# Patient Record
Sex: FEMALE | Race: WHITE | NOT HISPANIC OR LATINO
[De-identification: names, ages, dates, MRNs, and addresses within clinical notes are randomized per-mention and may not be internally consistent; named-entity substitution may affect disease eponyms.]

---

## 2023-05-25 PROBLEM — Z00.00 ENCOUNTER FOR PREVENTIVE HEALTH EXAMINATION: Status: ACTIVE | Noted: 2023-05-25

## 2023-06-07 ENCOUNTER — NON-APPOINTMENT (OUTPATIENT)
Age: 28
End: 2023-06-07

## 2023-06-07 ENCOUNTER — TRANSCRIPTION ENCOUNTER (OUTPATIENT)
Age: 28
End: 2023-06-07

## 2023-06-07 ENCOUNTER — APPOINTMENT (OUTPATIENT)
Dept: NEUROLOGY | Facility: CLINIC | Age: 28
End: 2023-06-07
Payer: COMMERCIAL

## 2023-06-07 VITALS
HEART RATE: 79 BPM | WEIGHT: 204 LBS | BODY MASS INDEX: 32.02 KG/M2 | DIASTOLIC BLOOD PRESSURE: 71 MMHG | OXYGEN SATURATION: 98 % | SYSTOLIC BLOOD PRESSURE: 115 MMHG | HEIGHT: 67 IN

## 2023-06-07 DIAGNOSIS — N20.0 CALCULUS OF KIDNEY: ICD-10-CM

## 2023-06-07 PROCEDURE — 99205 OFFICE O/P NEW HI 60 MIN: CPT

## 2023-06-10 RX ORDER — ONABOTULINUMTOXINA 100 [USP'U]/1
100 INJECTION, POWDER, LYOPHILIZED, FOR SOLUTION INTRADERMAL; INTRAMUSCULAR
Qty: 1 | Refills: 0 | Status: ACTIVE | COMMUNITY
Start: 2023-06-10

## 2023-06-10 RX ORDER — SEMAGLUTIDE 1 MG/.5ML
1 INJECTION, SOLUTION SUBCUTANEOUS
Qty: 1 | Refills: 0 | Status: ACTIVE | COMMUNITY
Start: 2023-06-10

## 2023-06-10 NOTE — ASSESSMENT
[FreeTextEntry1] : Patient is a 26yo female with history of intracranial hypertension who presents for evaluation of headaches.  Her headaches have been generally well controlled on current regimen of botox however recent recurrence of headaches.  She is not on disease modifying therapy for pseudotumor cerebri despite symptomatic management.  Her present headaches to have a migranous component to them, however given risk of complication of unmanaged pseudotumor will ensure thorough complete evaluation that this is appropriately corrected.\par \par -To review medical records provided at visit and provide further recommendations for need for further testing/medical management\par -Poor candidate given intolerance/side effects to oral medication management for condition\par -Patient wishes to continue botox therapy at site closer to her home ([facility])\par -Eval ophthalmology records to ensure pressure appropriately managed\par -Encouraged headache diary, well managed in recent history until last few days, not a clearly established new headache pattern so encouraged patient to call next week if headaches are persisting\par -Follow up via telemedicine in 1 month

## 2023-06-10 NOTE — HISTORY OF PRESENT ILLNESS
[FreeTextEntry1] : Patient is a 26yo female with history of intracranial hypertension who presents for evaluation of intracranial hypertension.  The patient has been evaluated previously and has confirmed diagnosis of pseudotumor cerebri. About one year ago the patient began experiencing significant headaches. She was initially evaluated by a neurologist (Dr. Aiken) who felt she had primary headaches. She was later seen by Dr. Jackson (neurology) in [facility] for neurology and was sent to the hospital.  She had lumbar puncture done.  Opening pressure was [id number] at that time.  She had been treated with nurtec, amitriptyline, and botox.  She was on diamox for period of time but this caused significant swelling in her legs so it was discontinued.  She is not presently on any pseudotumor specific medications.  She had MRV done on 6/1/23 which was unremarkable. She has also started working with a weight loss specialist and has lost about 20 pounds since the end of march with diet, exercise, and wegovy.  She finds that when she lost 20 pounds last year and later regained the weight she had improvement in her headaches.  Also related to stress.  She has lightheadedness if she stands up too quickly.  She has had some increase in feeling off balance.  She started on botox about 5 weeks ago and has overall good efficacy.  She had mirena removed as there was a consideration that potentially her headaches were worsening related to hormonal involvement. She is not scheduled for botox until august. She has had reasonably good headache control on botox however has had recent increase in headaches over the last two days.  She has had 6/10 pain severity headaches extending into back and shoulders over last few days.  Of note, discussion previously with her neurologist regarding topamax however she has recent history of kidney stones so topamax was not recommended.  \par \par She started having severe headaches about one year ago.  She had significant pressure behind both eyes.  She started losing her vision in November.  She has had more severe headaches over the last few days.  If she moves her head too quickly then it can cause some floaters and abnormal vision.  She recently had her eyes checked by ophthalmology who did not feel that she had optic disc swelling.  She was evaluated with them 6 days prior to current visit.  They are conducting peripheral vision testing on 6/15/23.  No prior history of headaches prior to one year ago. \par \par Patient provided permission to leave [facility] if any additional orders are required.  She provided thorough documentation of her medical history at the time of the visit.  \par \par Patient care team: \par [facility] \par [facility] ([place])\par \par Frequency: once every 3-4 days, brief when comes about\par Severity: 8/10\par Duration: Once starts, persists all day  \par Quality: Pressure behind eyes\par Location: Back of skull and behind eyes when severe\par Photophobia/Phonophobia: ++ photophobia, + phonophobia\par Nausea/Vomiting:  +N/V\par Sensory auras: Tingling just with headaches\par Other associated symptoms:\par Disabling Deficits: Call out of work, leave work early because of severity\par Medication Trial: Nurtec (not effective), diamox (swelling), amitriptyline (weight gain), botox (helpful without adverse effect), ibuprofen (high doses, no relief)\par Alleviating Factors: Amitriptyline\par Aggravating Factors: Light \par Positional Component: Laying down more comfortable\par Other: Blurred vision, neck tension, tingling sensation (not pins and needles), no history of neck problems. 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

## 2023-06-10 NOTE — PHYSICAL EXAM
[FreeTextEntry1] : Constitutional: Well nourished, well developed adult in no distress\par Psych: Mood broad, affect not restricted\par Head: NCAT. Negative occipital notch tenderness\par Eyes: Anicteric\par Ears: Normal appearing pinna\par Neck: Normal appearing and not enlarged\par Lungs: No accessory muscle use, no respiratory distress\par Cardiac/Vascular: No edema\par Abdomen: Non-distended\par Skin: No visible lesions or rashes. Skin is warm and dry\par \par Neurological Examination\par Mental status: Awake, alert, appropriate.  Gives detailed history. oriented to person, place, and time.\par \par Cranial nerves  \par II: Visual acuity grossly intact, visual fields full\par III, IV, VI: PERRLA, EOMI, no nystagmus \par V: Facial sensation is normal B/L.    \par VII: Facial strength is normal B/L.  \par VIII:   Hearing grossly intact bilaterally to spoken voice although not formally assessed\par IX, X: Palate is midline and elevates symmetrically.    \par XI: Trapezius normal strength.    \par XII: Tongue midline without atrophy or fasciculations.    \par \par Speech: No aphasia, dysarthria.  Normal intonation and prosody of speech\par Motor: Normal muscle bulk and tone throughout.  \par Muscle Strength: 5/5 muscle strength throughout bilateral upper and lower extremities with assessed shoulder abduction, elbow flexion/extension, hand , hip flexion, knee flexion/extension, ankle plantarflexion/dorsiflexion\par Reflexes: 2+ reflexes throughout. \par Coordination No ataxia - FTN, HTS within normal limits. No tremor or dysmetria\par Sensory: Sensation to light touch intact bilateral upper and lower extremities\par Station: No loss of balance.\par Gait  Normal stance, no loss of balance\par

## 2023-06-10 NOTE — DATA REVIEWED
[No studies available for review at this time.] : No studies available for review at this time. [de-identified] : MRV: No obvious thrombosis

## 2023-06-19 ENCOUNTER — NON-APPOINTMENT (OUTPATIENT)
Age: 28
End: 2023-06-19

## 2023-07-03 RX ORDER — UBROGEPANT 50 MG/1
TABLET ORAL
Qty: 10 | Refills: 0 | Status: ACTIVE | COMMUNITY
Start: 2023-06-21 | End: 1900-01-01

## 2023-07-19 ENCOUNTER — APPOINTMENT (OUTPATIENT)
Dept: NEUROLOGY | Facility: CLINIC | Age: 28
End: 2023-07-19
Payer: COMMERCIAL

## 2023-07-19 DIAGNOSIS — G43.909 MIGRAINE, UNSPECIFIED, NOT INTRACTABLE, W/OUT STATUS MIGRAINOSUS: ICD-10-CM

## 2023-07-19 DIAGNOSIS — G93.2 BENIGN INTRACRANIAL HYPERTENSION: ICD-10-CM

## 2023-07-19 PROCEDURE — 99213 OFFICE O/P EST LOW 20 MIN: CPT | Mod: 95

## 2023-07-19 NOTE — DATA REVIEWED
[No studies available for review at this time.] : No studies available for review at this time. [de-identified] : MRV: No obvious thrombosis

## 2023-07-19 NOTE — PHYSICAL EXAM
[FreeTextEntry1] : Constitutional: Well nourished, well developed adult in no distress\par Psych: Mood broad, affect not restricted\par Eyes: Anicteric\par Ears: Normal appearing pinna\par Neck: Normal appearing and not enlarged\par Skin: No visible lesions or rashes. Skin is warm and dry\par \par Neurological Examination\par Mental status: Awake, alert, appropriate.  Gives detailed history. oriented to person, place, and time.\par \par Cranial nerves  \par VII: Facial strength is normal B/L.  \par VIII:   Hearing grossly intact bilaterally to spoken voice although not formally assessed\par

## 2023-07-19 NOTE — ASSESSMENT
[FreeTextEntry1] : Patient is a 28yo female with history of intracranial hypertension who presents for evaluation of headaches.  Her headaches have been generally well controlled on current regimen of botox however recent recurrence of headaches.  She is not on disease modifying therapy for pseudotumor cerebri despite symptomatic management.  Recent ophtho eval suggests good management of optic disc swelling with weight loss.  Her present headaches to have a migranous component to them, however given risk of complication of unmanaged pseudotumor will ensure thorough complete evaluation that this is appropriately corrected.\par \par -Poor candidate given intolerance/side effects to oral medication management for condition\par -Continue aimovig 70mg/mL monthly \par -Continue ubrelvy for abortive therapy\par -Patient wishes to continue botox therapy at site closer to her home ([facility]), will consider if ongoing botox is needed after next visit, encourage her to give trial to extending botox visits to monitor for any persistent symptoms  with wearing given efficacy of aimovig\par -Follow up via telemedicine prior to next scheduled botox visit

## 2023-07-19 NOTE — HISTORY OF PRESENT ILLNESS
[FreeTextEntry1] : Patient is a 28yo female with history of intracranial hypertension who presents for evaluation of intracranial hypertension.  \par \par Follow up  TEB with Kenia Brunson PA-C on 7/19/23\par She feels that the aimovig and ubrelvy has been helping.  She removed before administering the first dose.  She has had to use ubrelvy 50mg x 2 once and 1 pill on another occasion.  She has had some other breakthrough headaches which are more dull and mild in severity.  Her headaches are to the point where she is able to fully function.  She had a sharp pain on left side of her head that lasted for about an hour and then resolved.  She has some days.  She had good efficacy with ubrelvy for response.  She denies injection site rash, but did have pain at injection site.  She has had some increase in neck pain and into shoulders in area of trapezius.  That is often the first symptom that comes about for her preceding onset of a headache.  She finds that the pain is uncomfortable and she is able to function with these issues.  She has some days where she wakes up with dull pain but it improves quickly.  Vision is not anywhere near as bad as it used to be after birth control was discontinued.  She has a handful of times where she felt that she had a fog in her left eye but nothing prominent or persistent.  She is no longer getting blurred vision, lines in her vision.  She is due in 2 weeks for her next botox injection, 8/3/23.  She has lost 30 pounds since march.  She has only had one injection of botox to this point.  No other concerns at this time.\par \par 6/7/23\par The patient has been evaluated previously and has confirmed diagnosis of pseudotumor cerebri. About one year ago the patient began experiencing significant headaches. She was initially evaluated by a neurologist (Dr. Aiken) who felt she had primary headaches. She was later seen by Dr. Jackson (neurology) in [facility] for neurology and was sent to the hospital.  She had lumbar puncture done.  Opening pressure was [id number] at that time.  She had been treated with nurtec, amitriptyline, and botox.  She was on diamox for period of time but this caused significant swelling in her legs so it was discontinued.  She is not presently on any pseudotumor specific medications.  She had MRV done on 6/1/23 which was unremarkable. She has also started working with a weight loss specialist and has lost about 20 pounds since the end of march with diet, exercise, and wegovy.  She finds that when she lost 20 pounds last year and later regained the weight she had improvement in her headaches.  Also related to stress.  She has lightheadedness if she stands up too quickly.  She has had some increase in feeling off balance.  She started on botox about 5 weeks ago and has overall good efficacy.  She had mirena removed as there was a consideration that potentially her headaches were worsening related to hormonal involvement. She is not scheduled for botox until august. She has had reasonably good headache control on botox however has had recent increase in headaches over the last two days.  She has had 6/10 pain severity headaches extending into back and shoulders over last few days.  Of note, discussion previously with her neurologist regarding topamax however she has recent history of kidney stones so topamax was not recommended.  \par \par She started having severe headaches about one year ago.  She had significant pressure behind both eyes.  She started losing her vision in November.  She has had more severe headaches over the last few days.  If she moves her head too quickly then it can cause some floaters and abnormal vision.  She recently had her eyes checked by ophthalmology who did not feel that she had optic disc swelling.  She was evaluated with them 6 days prior to current visit.  They are conducting peripheral vision testing on 6/15/23.  No prior history of headaches prior to one year ago. \par \par Patient provided permission to leave [facility] if any additional orders are required.  She provided thorough documentation of her medical history at the time of the visit.  \par \par Patient care team: \par [facility] \par [facility] ([place])\par \par Frequency: once every 3-4 days, brief when comes about\par Severity: 8/10\par Duration: Once starts, persists all day  \par Quality: Pressure behind eyes\par Location: Back of skull and behind eyes when severe\par Photophobia/Phonophobia: ++ photophobia, + phonophobia\par Nausea/Vomiting:  +N/V\par Sensory auras: Tingling just with headaches\par Other associated symptoms:\par Disabling Deficits: Call out of work, leave work early because of severity\par Medication Trial: Nurtec (not effective), diamox (swelling), amitriptyline (weight gain), botox (helpful without adverse effect), ibuprofen (high doses, no relief)\par Alleviating Factors: Amitriptyline\par Aggravating Factors: Light \par Positional Component: Laying down more comfortable\par Other: Blurred vision, neck tension, tingling sensation (not pins and needles), no history of neck problems. 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

## 2023-11-20 RX ORDER — ERENUMAB-AOOE 70 MG/ML
70 INJECTION SUBCUTANEOUS
Qty: 1 | Refills: 5 | Status: ACTIVE | COMMUNITY
Start: 2023-06-21 | End: 1900-01-01